# Patient Record
Sex: FEMALE | Employment: UNEMPLOYED | URBAN - METROPOLITAN AREA
[De-identification: names, ages, dates, MRNs, and addresses within clinical notes are randomized per-mention and may not be internally consistent; named-entity substitution may affect disease eponyms.]

---

## 2019-07-20 ENCOUNTER — HOSPITAL ENCOUNTER (EMERGENCY)
Facility: CLINIC | Age: 49
Discharge: HOME | End: 2019-07-20
Attending: EMERGENCY MEDICINE | Admitting: EMERGENCY MEDICINE
Payer: COMMERCIAL

## 2019-07-20 ENCOUNTER — APPOINTMENT (OUTPATIENT)
Dept: CT IMAGING | Facility: CLINIC | Age: 49
End: 2019-07-20
Attending: NURSE PRACTITIONER
Payer: COMMERCIAL

## 2019-07-20 VITALS
HEART RATE: 71 BPM | SYSTOLIC BLOOD PRESSURE: 147 MMHG | TEMPERATURE: 98.7 F | OXYGEN SATURATION: 99 % | RESPIRATION RATE: 18 BRPM | DIASTOLIC BLOOD PRESSURE: 84 MMHG

## 2019-07-20 DIAGNOSIS — S09.90XA MINOR HEAD INJURY, INITIAL ENCOUNTER: ICD-10-CM
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
## 2019-07-20 DIAGNOSIS — S01.81XA FACIAL LACERATION, INITIAL ENCOUNTER: ICD-10-CM

## 2019-07-20 PROCEDURE — 99284 EMERGENCY DEPT VISIT MOD MDM: CPT | Mod: 25

## 2019-07-20 PROCEDURE — 70450 CT HEAD/BRAIN W/O DYE: CPT

## 2019-07-20 PROCEDURE — 12014 RPR F/E/E/N/L/M 5.1-7.5 CM: CPT

## 2019-07-20 RX ORDER — LIDOCAINE HYDROCHLORIDE AND EPINEPHRINE 10; 10 MG/ML; UG/ML
INJECTION, SOLUTION INFILTRATION; PERINEURAL
Status: DISCONTINUED
Start: 2019-07-20 | End: 2019-07-20 | Stop reason: HOSPADM

## 2019-07-20 ASSESSMENT — ENCOUNTER SYMPTOMS
SHORTNESS OF BREATH: 0
WOUND: 1
NAUSEA: 0
VOMITING: 0

## 2019-07-20 NOTE — ED PROVIDER NOTES
History     Chief Complaint:  Fall & Laceration     The history is provided by the patient. The history is limited by a language barrier. A  was used.      Suzette Mckeon is a 49 year old female who presents after fall with concerns for a periorbital laceration in the setting of a possible head injury. Patient states she tripped over something getting out of her car at the gas station, striking her head on an aluminum surface and sustaining a laceration from the corner of her right upper lid radiating superiorly and medially through her eyebrow. She denies any chest pain or dyspnea prior to her fall as well as any syncope following her fall. She also denies any nausea, vomiting, or chance of pregnancy. Per BK, last Td was on 2012.     Allergies:  NKDA     Medications:    Oretic     Past Medical History:    Tendinopathy of left rotator cuff  Chronic right shoulder pain    Past Surgical History:    The patient does not have any pertinent past surgical history.    Family History:    No past pertinent family history.    Social History:  Never smoker.  Positive for alcohol use.   Marital Status: Single    Review of Systems   Eyes: Negative for photophobia and visual disturbance.   Respiratory: Negative for shortness of breath.    Cardiovascular: Negative for chest pain.   Gastrointestinal: Negative for nausea and vomiting.   Skin: Positive for wound.   Neurological: Negative for syncope, weakness and numbness.   All other systems reviewed and are negative.    Physical Exam   Patient Vitals for the past 24 hrs:   BP Temp Temp src Pulse Heart Rate Resp SpO2   07/20/19 1446 161/85 98.7  F (37.1  C) Oral 82 82 18 99 %     Physical Exam  General: Alert, moderate discomfort, well kept  Eyes: PERRL, conjunctivae pink no scleral icterus or conjunctival injection normal extraocular movements  ENT:   Moist mucus membranes, posterior oropharynx clear without erythema or exudates, No lymphadenopathy,  Normal voice, no alarcon signs, no hemotympanum  Resp:  Lungs clear to auscultation bilaterally, no crackles/rubs/wheezes. Good air movement  CV:  Normal rate and rhythm, no murmurs/rubs/gallops  GI:  Abdomen soft and non-distended.  Normoactive BS.  No tenderness, guarding or rebound, No masses  Skin:  Warm, dry.  6 cm diagonal laceration right side periorbital area through eyebrow.   Musculoskeletal: No peripheral edema or calf tenderness, Normal gross ROM, no cervical spine tenderness, normal strength of all extremities  Neuro: Alert and oriented to person/place/time, normal sensation, GCS 15, follows commands  Psychiatric: Normal affect, cooperative, good eye contact    Emergency Department Course     Imaging:  Head CT w/o contrast  Normal CT scan of the head.  Reading per radiology    Procedures:    Narrative: Procedure: Laceration Repair        LACERATION:  A simple clean 6.5 cm laceration.      LOCATION:  Right superior periorbital region      ANESTHESIA:  Local using Lidocaine 1% with Epinephrine total of 4 mLs      PREPARATION:  Irrigation with Normal Saline      DEBRIDEMENT:  no debridement and wound explored, no foreign body found      CLOSURE:  Wound was closed with One Layer.  Skin closed with 15 x 6.0 Ethylon using interrupted sutures.    Emergency Department Course:  1450 Nursing notes and vitals reviewed.  1452 I performed an exam of the patient as documented above.   1501 Sufficient anesthesia obtained as above.   1520 The patient was sent for a CT while in the emergency department, results above.   1546 Laceration repaired as above. I personally reviewed the imaging results with the patient and answered all related questions prior to discharge, anticipatory guidance given.    Impression & Plan      Medical Decision Making:  Suzette Mckeon is a 49 year old female who presents for evaluation following a head injury.  The injury was mechanical in nature.  The patient is not on any blood thinners.   The patient has no neurologic deficit.  The patient had 0 episodes of vomiting.  Imaging was discussed and obtained, based on risk stratification, patient comfort, and symptoms. Given the mechanism of the injury, the lack of focal neurologic deficit, no LOC, no seizure activity, and negative imaging, I believe serious cranial pathology is unlikely.    Patient also had findings and exam consistent with an uncomplicated laceration of superior periorbital region. The wound was carefully evaluated and explored. Was repaired as noted above. There is no evidence at this time of bony injury, foreign body, deep space infection, or neurovascular injury. Follow up in 2-3 days time if concerns over healing. Possible complications (infection, scarring) were reviewed with the patient. The patient is to follow-up for suture removal in 7-10 days. Indications for immediate return to ER/UR were reviewed and included but are not limited to, redness, fevers, drainage, increasing pain, high fevers, or other concerns.     The patient was counseled regarding post-concussive syndrome including cognitive, behavioral and emotional aspects. They were also instructed to refrain from any contact sports until symptom free for at least two weeks or until cleared by a primary care provider. Additionally, the patient is not to participate in strenuous activities for one week or until cleared by a primary care provider. The patient is comfortable with discharge home and out-patient follow up. All questions answered prior to discharge.     Diagnosis:    ICD-10-CM   1. Fall, initial encounter W19.XXXA   2. Minor head injury, initial encounter S09.90XA   3. Facial laceration, initial encounter S01.81XA     Disposition: Home    Scribe Disclosure:  ANAMARIA, Miah Durán, am serving as a scribe at 2:52 PM on 7/20/2019 to document services personally performed by Galileo Villarreal APRN based on my observations and the provider's statements to me.    [facility]  [facility] EMERGENCY DEPARTMENT       Galileo Villarreal, APRN CNP  07/24/19 0202

## 2019-07-20 NOTE — DISCHARGE INSTRUCTIONS
Discharge Instructions  Laceration (Cut)    You were seen today for a laceration (cut).  Your provider examined your laceration for any problems such a buried foreign body (like glass, a splinter, or gravel), or injury to blood vessels, tendons, and nerves.  Your provider may have also rinsed and/or scrubbed your laceration to help prevent an infection. It may not be possible to find all problems with your laceration on the first visit; occasionally foreign bodies or a tendon injury can go undetected.    Your laceration may have been closed in one of several ways:  No closure: many wounds will heal just fine without closure.  Stitches: regular stitches that require removal.  Staples: skin staples are often used in the scalp/head.  Wound adhesive (glue): skin glue can be used for certain lacerations and doesn t require removal.  Wound strips (aka Butterfly bandages or steri-strips): these are bandages that help to close a wound.  Absorbable stitches:  dissolving  stitches that go away on their own and usually don t require removal.    A small percentage of wounds will develop an infection regardless of how well the wound is cared for. Antibiotics are generally not indicated to prevent an infection so are only given for a small number of high-risk wounds. Some lacerations are too high risk to close, and are left open to heal because closure can increase the likelihood that an infection will develop.    Remember that all lacerations, no matter how expertly repaired, will cause scarring. We consider many factors, techniques, and materials, in our efforts to provide the best possible cosmetic outcome.    Generally, every Emergency Department visit should have a follow-up clinic visit with either a primary or a specialty clinic/provider. Please follow-up as instructed by your emergency provider today.     Return to the Emergency Department right away if:  You have more redness, swelling, pain, drainage (pus), a bad smell,  or red streaking from your laceration as these symptoms could indicate an infection.  You have a fever of 100.4 F or more.  You have bleeding that you cannot stop at home. If your cut starts to bleed, hold pressure on the bleeding area with a clean cloth or put pressure over the bandage.  If the bleeding does not stop after using constant pressure for 30 minutes, you should return to the Emergency Department for further treatment.  An area past the laceration is cool, pale, or blue compared with the other side, or has a slower return of color when squeezed.  Your dressing seems too tight or starts to get uncomfortable or painful. For children, signs of a problem might be irritability or restlessness.  You have loss of normal function or use of an area, such as being unable to straighten or bend a finger normally.  You have a numb area past the laceration.    Return to the Emergency Department or see your regular provider if:  The laceration starts to come open.   You have something coming out of the cut or a feeling that there is something in the laceration.  Your wound will not heal, or keeps breaking open. There can always be glass, wood, dirt or other things in any wound.  They will not always show up, even on x-rays.  If a wound does not heal, this may be why, and it is important to follow-up with your regular provider.    Home Care:  Take your dressing off in 12-24 hours, or as instructed by your provider, to check your laceration. Remove the dressing sooner if it seems too tight or painful, or if it is getting numb, tingly, or pale past the dressing.  Gently wash your laceration 1-2 times daily with clean water and mild soap. It is okay to shower or run clean water over the laceration, but do not let the laceration soak in water (no swimming).  If your laceration was closed with wound adhesive or strips: pat it dry and leave it open to the air. For all other repairs: after you wash your laceration, or at least  2 times a day, apply antibiotic ointment (such as Neosporin  or Bacitracin ) to the laceration, then cover it with a Band-Aid  or gauze.  Keep the laceration clean. Wear gloves or other protective clothing if you are around dirt.    Follow-up for removal:  If your wound was closed with staples or regular stitches, they need to be removed according to the instructions and timeline specified by your provider today.  If your wound was closed with absorbable ( dissolving ) sutures, they should fall out, dissolve, or not be visible in about one week. If they are still visible, then they should be removed according to the instructions and timeline specified by your provider today.    Scars:  To help minimize scarring:  Wear sunscreen over the healed laceration when out in the sun.  Massage the area regularly once healed.  You may apply Vitamin E to the healed wound.  Wait. Scars improve in appearance over months and years.    If you were given a prescription for medicine here today, be sure to read all of the information (including the package insert) that comes with your prescription.  This will include important information about the medicine, its side effects, and any warnings that you need to know about.  The pharmacist who fills the prescription can provide more information and answer questions you may have about the medicine.  If you have questions or concerns that the pharmacist cannot address, please call or return to the Emergency Department.       Remember that you can always come back to the Emergency Department if you are not able to see your regular provider in the amount of time listed above, if you get any new symptoms, or if there is anything that worries you.

## 2019-07-20 NOTE — ED AVS SNAPSHOT
[facility] Emergency Department  [address]  [facility] [address]  Phone:  [phone]  Fax:  [phone]                                    Suzette Mckeon   MRN: [id number]    Department:  [facility] Emergency Department   Date of Visit:  7/20/2019           After Visit Summary Signature Page    I have received my discharge instructions, and my questions have been answered. I have discussed any challenges I see with this plan with the nurse or doctor.    ..........................................................................................................................................  Patient/Patient Representative Signature      ..........................................................................................................................................  Patient Representative Print Name and Relationship to Patient    ..................................................               ................................................  Date                                   Time    ..........................................................................................................................................  Reviewed by Signature/Title    ...................................................              ..............................................  Date                                               Time          22EPIC Rev 08/18

## 2019-07-20 NOTE — ED TRIAGE NOTES
Patient fell in the street and hit right eyebrow on something metal. Also complains on left leg pain.

## 2019-07-22 ENCOUNTER — NURSE TRIAGE (OUTPATIENT)
Dept: NURSING | Facility: CLINIC | Age: 49
End: 2019-07-22

## 2019-07-23 NOTE — TELEPHONE ENCOUNTER
Suzette reports bleeding from sutured laceration on her right eyebrow. The bleeding is not excessive but won't stop.    She also reports numbness to her skull above the laceration.    Per ER AVS:  Return to the Emergency Department right away if:    You have more redness, swelling, pain, drainage (pus), a bad smell, or red streaking from your laceration as these  symptoms could indicate an infection.    You have a fever of 100.4 F or more.    You have bleeding that you cannot stop at home. If your cut starts to bleed, hold pressure on the bleeding area with  a clean cloth or put pressure over the bandage. If the bleeding does not stop after using constant pressure for 30  minutes, you should return to the Emergency Department for further treatment.    An area past the laceration is cool, pale, or blue compared with the other side, or has a slower return of color when  squeezed.    Your dressing seems too tight or starts to get uncomfortable or painful. For children, signs of a problem might be  irritability or restlessness.    You have loss of normal function or use of an area, such as being unable to straighten or bend a finger normally.    You have a numb area past the laceration.    Shira Martinez RN  [facility]      Reason for Disposition    [1] Follow-up call to recent contact AND [2] information only call, no triage required    Protocols used: INFORMATION ONLY CALL-A-

## 2019-07-24 ASSESSMENT — ENCOUNTER SYMPTOMS
PHOTOPHOBIA: 0
WEAKNESS: 0
NUMBNESS: 0

## 2019-07-30 ENCOUNTER — HOSPITAL ENCOUNTER (EMERGENCY)
Facility: CLINIC | Age: 49
Discharge: HOME | End: 2019-07-30
Attending: EMERGENCY MEDICINE | Admitting: EMERGENCY MEDICINE
Payer: COMMERCIAL

## 2019-07-30 VITALS
RESPIRATION RATE: 16 BRPM | OXYGEN SATURATION: 99 % | DIASTOLIC BLOOD PRESSURE: 87 MMHG | TEMPERATURE: 98 F | HEART RATE: 89 BPM | SYSTOLIC BLOOD PRESSURE: 128 MMHG

## 2019-07-30 DIAGNOSIS — Z48.02 VISIT FOR SUTURE REMOVAL: ICD-10-CM

## 2019-07-30 DIAGNOSIS — R20.2 PARESTHESIA: ICD-10-CM

## 2019-07-30 PROCEDURE — 99282 EMERGENCY DEPT VISIT SF MDM: CPT

## 2019-07-30 RX ORDER — IBUPROFEN 200 MG
400 TABLET ORAL EVERY 8 HOURS PRN
Qty: 60 TABLET | Refills: 0 | Status: SHIPPED | OUTPATIENT
Start: 2019-07-30

## 2019-07-30 ASSESSMENT — ENCOUNTER SYMPTOMS
ARTHRALGIAS: 1
NUMBNESS: 1

## 2019-07-30 NOTE — ED AVS SNAPSHOT
[facility] Emergency Department  [address]  [facility] [address]  Phone:  [phone]  Fax:  [phone]                                    Suzette Mckeon   MRN: [id number]    Department:  [facility] Emergency Department   Date of Visit:  7/30/2019           After Visit Summary Signature Page    I have received my discharge instructions, and my questions have been answered. I have discussed any challenges I see with this plan with the nurse or doctor.    ..........................................................................................................................................  Patient/Patient Representative Signature      ..........................................................................................................................................  Patient Representative Print Name and Relationship to Patient    ..................................................               ................................................  Date                                   Time    ..........................................................................................................................................  Reviewed by Signature/Title    ...................................................              ..............................................  Date                                               Time          22EPIC Rev 08/18

## 2019-07-30 NOTE — ED TRIAGE NOTES
Patient presents to the ED requesting suture removal.  had sutures placed 10 days ago. No concerns about wound healing, but  has had numbness on the top of the head since the 10th, which she did not have before.

## 2019-07-30 NOTE — ED PROVIDER NOTES
History     Chief Complaint:  Suture Removal and Numbness       HPI   Suzette Mckeon is a 49 year old female who presents to the ED for suture removal and evaluation of scalp numbness.  The patient reports that she had sutures placed 10 days ago following a fall.  She was seen here at that time.  See MDM below for additional details.  She states that her wound has been well healing.  She notes however that she has been experiencing numbness at the apex of her scalp since sustaining the laceration.  This numbness has not improved.  She also notes that she has been experiencing left knee pain since the fall.  She states that she has been trying Advil but this has not improved her symptoms.  She denies any distal numbness or weakness.  She states that she was seen 8 days ago for the left knee pain at an [facility] ED and had an x-ray that revealed a fibular fracture.  She was told that no operation was needed, and was placed in a knee immobilizer and told to be nonweightbearing.  She states that she has not been using the knee immobilizer as she finds it to be uncomfortable.  She was additionally provided crutches. She has not been using these either and has been ambulatory. See MDM and imaging result below.    Medical Decision Making from [facility] ED visit 10 days ago (7/20/19):  Suzette Mckeon is a 49 year old female who presents for evaluation following a head injury.  The injury was mechanical in nature.  The patient is not on any blood thinners.  The patient has no neurologic deficit.  The patient had 0 episodes of vomiting.  Imaging was discussed and obtained, based on risk stratification, patient comfort, and symptoms. Given the mechanism of the injury, the lack of focal neurologic deficit, no LOC, no seizure activity, and negative imaging, I believe serious cranial pathology is unlikely.     Patient also had findings and exam consistent with an uncomplicated laceration of superior periorbital region. The  wound was carefully evaluated and explored. Was repaired as noted above. There is no evidence at this time of bony injury, foreign body, deep space infection, or neurovascular injury. Follow up in 2-3 days time if concerns over healing. Possible complications (infection, scarring) were reviewed with the patient. The patient is to follow-up for suture removal in 7-10 days. Indications for immediate return to ER/UR were reviewed and included but are not limited to, redness, fevers, drainage, increasing pain, high fevers, or other concerns.      The patient was counseled regarding post-concussive syndrome including cognitive, behavioral and emotional aspects. They were also instructed to refrain from any contact sports until symptom free for at least two weeks or until cleared by a primary care provider. Additionally, the patient is not to participate in strenuous activities for one week or until cleared by a primary care provider. The patient is comfortable with discharge home and out-patient follow up. All questions answered prior to discharge.     Impression and Plan from [facility] ED visit 8 days ago (7/22/19)  Suzette Thompson is a 49 y.o. female who presents for evaluation of left knee pain/venous oozing from right eyebrow laceration site that was repaired at another [facility] staffed ED after falling on the street on Saturday. CMS is intact distally in the Left lower extremity. X-rays reveal a nondisplaced fibular fracture that does not need reduction at this time. This is likely a non-operative injury, will place in knee immobilizer, nonweightbearing until orthopedics follow-up, crutch training provided.. Patient educated on natural course of this fracture, provided crutches, knee immobilizer, need for increasing gentle activity, and possible complications of fractures. They need to follow up with primary only and only if further symptoms or progressive symptoms will need to seek care again in UR/ED or  with specialist. Voicemail left for [facility]    In regard to facial laceration, sutures remain intact and wound appears to be healing appropriately. Venous oozing is minimal. Surgifoam placed over the wound and bandaging applied and reassurance provided, wound check recommended by primary physician in the next week.    Imaging from 8 days ago at [facility]:  XR Knee (2 views) Left:  Slight lucency and cortical irregularity seen at the medial aspect of  the proximal left fibular metaphysis. Clinical correlation for a nondisplaced  fibular fracture. Left knee elsewhere is negative. No knee effusion.  Report per radiology.     Allergies:  No Known Allergies     Medications:      ibuprofen (ADVIL/MOTRIN) 200 MG tablet   cyclobenzaprine (FLEXERIL) 10 MG tablet       Past Medical History:    Past Medical History:   Diagnosis Date     Hypertension        There are no active problems to display for this patient.       Past Surgical History:    No past surgical history on file.     Family History:    family history is not on file.    Social History:   reports that she has never smoked. She has never used smokeless tobacco. She reports that she drinks alcohol. She reports that she does not use drugs.    PCP: [facility]     Review of Systems   Musculoskeletal: Positive for arthralgias.   Neurological: Positive for numbness.   All other systems reviewed and are negative.        Physical Exam     Patient Vitals for the past 24 hrs:   BP Temp Pulse Resp SpO2   07/30/19 1024 128/87 98  F (36.7  C) 89 16 99 %        Physical Exam  Constitutional: Pleasant. Cooperative.   Eyes: Pupils equally round and reactive  HENT: Head is normal in appearance. Oropharynx is normal with moist mucus membranes. No bony step offs to apex of R scalp.  Cardiovascular: Regular rate and rhythm and without murmurs.  Respiratory: Normal respiratory effort, lungs are clear bilaterally.  GI: Abdomen is soft, non-tender, non-distended. No  guarding, rebound, or rigidity.  Musculoskeletal: Tenderness to palpation of L proximal fibula. Neurovascularly intact distally.  Skin: Normal, without rash.  Neurologic: Cranial nerves grossly intact, normal cognition, no focal deficits. Alert and oriented x 3.   Psychiatric: Normal affect.  Nursing notes and vital signs reviewed.    Emergency Department Course       Emergency Department Course:  Past medical records, nursing notes, and vitals reviewed.  I performed an exam of the patient and obtained history, as documented above.  Sutures removed by Dr. Rodriguez.  Patient discharged to home.    Impression & Plan      Medical Decision Making:  Suzette Mckeon is a 49 year old female who presents today for evaluation of suture removal and scalp numbness.  Patient had sutures placed 10 days ago after a fall.  She sustained a laceration above her right eyebrow.  Wound has healed well.  She states that since that time she has had increased numbness to the apex of her right scalp.  It was discussed with the patient that this is likely secondary to trauma sustained from the laceration of her right eyebrow to her right supraorbital nerve.  It was discussed that there may likely be some recovery of sensation, however that it may not ultimately fully recover.  With respect to the knee, patient was advised to wear the knee immobilizer and use crutches as directed, as this is a non-operative fracture.  Patient asked for ibuprofen prescription and this was provided.  Sutures were removed without complication.  All questions answered.  Patient was discharged home in stable condition.    Diagnosis:    ICD-10-CM    1. Visit for suture removal Z48.02    2. Paresthesia R20.2         Discharge Medications:     Medication List      Started    ibuprofen 200 MG tablet  Commonly known as:  ADVIL/MOTRIN  400 mg, Oral, EVERY 8 HOURS PRN           Thien Mejia PA-C  [facility] ED  July 30, 2019          Thien Mejia  DAVID  07/30/19 1419

## 2019-07-30 NOTE — ED NOTES
A/O. VSS. Pt verbalized understanding of d/c instructions and ambulated to lobby steady gait.   Script rx ibuprofen given to pt at time of d/c

## 2019-07-31 NOTE — ED PROVIDER NOTES
Emergency Department Attending Supervision Note  7/31/2019  8:24 AM      I evaluated this patient in conjunction with Thien Mejia PA-C      Briefly, the patient presented with  A visit for suture removal from R forehead. Has ongoing R forehead scalp paresthesias.  No fever, redness around wound, or wound drainage.       On my exam,     Gen: Resting comfortably   HEENT: sutures present R forehead diagonally from superior medial brow to inferior lateral brow.  No surrounding erythema, purulent drainage.  Resp: speaking in full sentences with any resp distress  Skin: warm dry well perfused  Neuro: Alert, no gross motor or sensory deficits,  gait stable        My impression is suture removal, no e/o infection.  Paresthesia due to supraorbital nerve injury with initial injury.          Diagnosis    ICD-10-CM    1. Visit for suture removal Z48.02    2. Paresthesia R20.2          Holden Rodriguez MD  [facility]  [facility]       Holden Rodriguez MD  07/31/19 0805

## 2019-11-18 ENCOUNTER — NURSE TRIAGE (OUTPATIENT)
Dept: NURSING | Facility: CLINIC | Age: 49
End: 2019-11-18

## 2019-11-18 NOTE — TELEPHONE ENCOUNTER
FNA triage call :   Presenting problem :  Pt called.  Declines interpretor. Constant , severe Headache  For last month , without fever. Numbness on R side of face .   Guideline used : Headache - A Oh .   Disposition and recommendations : call 911 but Pt hung up before saying if she would call 911 or not .   Caller verbalizes understanding and denies further questions and will call back if further symptoms to triage or questions  . Verónica Holt RN  - [facility] Nurse Advisor     Reason for Disposition    Numbness of the face, arm or leg on one side of the body and new onset    Additional Information    Negative: Difficult to awaken or acting confused (e.g., disoriented, slurred speech)    Negative: Weakness of the face, arm or leg on one side of the body and new onset    Protocols used: HEADACHE-A-OH
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
## 2023-12-18 ENCOUNTER — OFFICE VISIT (OUTPATIENT)
Dept: URGENT CARE | Facility: URGENT CARE | Age: 53
End: 2023-12-18
Payer: COMMERCIAL

## 2023-12-18 VITALS
DIASTOLIC BLOOD PRESSURE: 89 MMHG | RESPIRATION RATE: 16 BRPM | OXYGEN SATURATION: 98 % | HEART RATE: 65 BPM | TEMPERATURE: 97.4 F | WEIGHT: 179 LBS | SYSTOLIC BLOOD PRESSURE: 147 MMHG

## 2023-12-18 DIAGNOSIS — H10.9 BACTERIAL CONJUNCTIVITIS OF BOTH EYES: Primary | ICD-10-CM

## 2023-12-18 DIAGNOSIS — B96.89 BACTERIAL CONJUNCTIVITIS OF BOTH EYES: Primary | ICD-10-CM

## 2023-12-18 PROCEDURE — 99203 OFFICE O/P NEW LOW 30 MIN: CPT

## 2023-12-18 RX ORDER — POLYMYXIN B SULFATE AND TRIMETHOPRIM 1; 10000 MG/ML; [USP'U]/ML
2 SOLUTION OPHTHALMIC EVERY 6 HOURS
Qty: 10 ML | Refills: 0 | Status: SHIPPED | OUTPATIENT
Start: 2023-12-18 | End: 2023-12-25

## 2023-12-18 NOTE — LETTER
December 18, 2023      Suzette Mckeon  [address] [address] [place] [id number]        To Whom It May Concern:    Suzette Mckeon  was seen on December 18, 2023.  Please excuse her from work until December 20th due to illness.        Sincerely,        ROSALEE Cai CNP

## 2023-12-18 NOTE — PROGRESS NOTES
ASSESSMENT:  (H10.9,  B96.89) Bacterial conjunctivitis of both eyes  (primary encounter diagnosis)  Plan: polymixin b-trimethoprim (POLYTRIM) 21575-6.1         UNIT/ML-% ophthalmic solution    PLAN:  Conjunctivitis patient instructions in [place] discussed and provided.  Informed the patient to use eyedrops as prescribed and finish the full course even if symptoms improve.  We discussed using warm compresses to the eyes for the symptoms.  Work note provided.  Discussed the need to return to clinic with any new or worsening symptoms.  Patient acknowledged her understanding of the above plan.    The use of Dragon[employer] services may have been used to construct the content in this note; any grammatical or spelling errors are non-intentional. Please contact the author of this note directly if you are in need of any clarification.      ROSALEE Cai CNP    SUBJECTIVE:  Suzette Mckeon is a 53 year old female who presents complaining of moderate bilateral eye discomfort, itching, drainage, redness and watering for 2 days.   Details:  Associated Signs and Syptoms: none  Treatment measures tried include: warm washcloth and clear eyes eye drops  Contact wearer : no    ROS: negative except noted above      OBJECTIVE:  BP (!) 147/89 (BP Location: Left arm, Patient Position: Sitting, Cuff Size: Adult Regular)   Pulse 65   Temp 97.4  F (36.3  C) (Tympanic)   Resp 16   Wt 81.2 kg (179 lb)   SpO2 98%   General: no acute distress  Eye exam: bilateral eyes: lids normal; PERRL, EOM's intact; mild conjunctival injection; yellow drainage in the right inner canthus; increased watering

## 2023-12-18 NOTE — PATIENT INSTRUCTIONS
Use the eyedrops as prescribed and finish the full course even if symptoms improve.  You can also use warm compresses to the eyes for the symptoms.

## 2024-09-05 ENCOUNTER — OFFICE VISIT (OUTPATIENT)
Dept: OPTOMETRY | Facility: CLINIC | Age: 54
End: 2024-09-05
Payer: COMMERCIAL

## 2024-09-05 DIAGNOSIS — H02.051 TRICHIASIS OF RIGHT UPPER EYELID: Primary | ICD-10-CM

## 2024-09-05 PROCEDURE — 99203 OFFICE O/P NEW LOW 30 MIN: CPT | Mod: 25 | Performed by: OPTOMETRIST

## 2024-09-05 PROCEDURE — 67820 REVISE EYELASHES: CPT | Mod: E3 | Performed by: OPTOMETRIST

## 2024-09-05 ASSESSMENT — VISUAL ACUITY
CORRECTION_TYPE: GLASSES
OD_CC: 20/25
METHOD: SNELLEN - LINEAR
OS_CC: 20/40

## 2024-09-05 ASSESSMENT — CONF VISUAL FIELD
OD_INFERIOR_TEMPORAL_RESTRICTION: 0
OS_INFERIOR_NASAL_RESTRICTION: 0
OS_SUPERIOR_TEMPORAL_RESTRICTION: 0
OD_NORMAL: 1
OS_INFERIOR_TEMPORAL_RESTRICTION: 0
OS_SUPERIOR_NASAL_RESTRICTION: 0
OD_SUPERIOR_TEMPORAL_RESTRICTION: 0
OD_SUPERIOR_NASAL_RESTRICTION: 0
OD_INFERIOR_NASAL_RESTRICTION: 0
OS_NORMAL: 1

## 2024-09-05 ASSESSMENT — EXTERNAL EXAM - LEFT EYE: OS_EXAM: NORMAL

## 2024-09-05 ASSESSMENT — TONOMETRY
OS_IOP_MMHG: 17
IOP_METHOD: APPLANATION
OD_IOP_MMHG: 17

## 2024-09-05 ASSESSMENT — EXTERNAL EXAM - RIGHT EYE: OD_EXAM: NORMAL

## 2024-09-05 ASSESSMENT — SLIT LAMP EXAM - LIDS
COMMENTS: TRICHIASIS - UPPER LID
COMMENTS: NORMAL

## 2024-09-05 NOTE — PROGRESS NOTES
Chief Complaint   Patient presents with    Eye Problem Right Eye     Right eye feels sharp pain and darkness that has started this morning. No report of flashes or blurred vision.              Lamberto Mcdaniel - Optometric Assistant     See Review Of Systems       Medical, surgical and family histories reviewed and updated 9/5/2024.         OBJECTIVE: See Ophthalmology exam    ASSESSMENT:    ICD-10-CM    1. 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    Epilation (plucking) of the upper eyelid will help with improving comfort.    The cornea replaces itself weekly.    The eye redness will subside as the conjunctiva heals.    RTC 2-4 week for CEE or sooner if symptoms worsens      Elvia Yan O.D.  [address] [address]    [phone]

## 2024-09-05 NOTE — LETTER
9/5/2024      Suzette Mckeon  [address] [address] [place] [id number]      Dear Colleague,    Thank you for referring your patient, Suzette Mckeon, to the [facility]. Please see a copy of my visit note below.    Chief Complaint   Patient presents with     Eye Problem Right Eye     Right eye feels sharp pain and darkness that has started this morning. No report of flashes or blurred vision.              Lamberto Mcdaniel - Optometric Assistant     See Review Of Systems       Medical, surgical and family histories reviewed and updated 9/5/2024.         OBJECTIVE: See Ophthalmology exam    ASSESSMENT:    ICD-10-CM    1. 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    Epilation (plucking) of the upper eyelid will help with improving comfort.    The cornea replaces itself weekly.    The eye redness will subside as the conjunctiva heals.    RTC 2-4 week for CEE or sooner if symptoms worsens      Elvia Yan O.D.  [facility]   [address] [place] [address]    [phone]         Again, thank you for allowing me to participate in the care of your patient.        Sincerely,        Elvia Yan OD

## 2024-09-05 NOTE — PATIENT INSTRUCTIONS
Trichiasis is today's diagnosis.    Epilation (plucking) of the upper eyelid will help with improving comfort.    The cornea replaces itself weekly.    The eye redness will subside as the conjunctiva heals.    RTC 2-4 week for CEE or sooner if symptoms worsens      Elvia Yan O.D.  [address] [address]    [phone]

## 2024-09-27 ENCOUNTER — ANCILLARY PROCEDURE (OUTPATIENT)
Dept: GENERAL RADIOLOGY | Facility: CLINIC | Age: 54
End: 2024-09-27
Attending: PHYSICIAN ASSISTANT
Payer: COMMERCIAL

## 2024-09-27 ENCOUNTER — ANCILLARY PROCEDURE (OUTPATIENT)
Dept: GENERAL RADIOLOGY | Facility: CLINIC | Age: 54
End: 2024-09-27
Attending: PHYSICIAN ASSISTANT

## 2024-09-27 ENCOUNTER — OFFICE VISIT (OUTPATIENT)
Dept: URGENT CARE | Facility: URGENT CARE | Age: 54
End: 2024-09-27

## 2024-09-27 VITALS
HEART RATE: 88 BPM | WEIGHT: 188.6 LBS | TEMPERATURE: 98.6 F | OXYGEN SATURATION: 97 % | RESPIRATION RATE: 18 BRPM | DIASTOLIC BLOOD PRESSURE: 84 MMHG | SYSTOLIC BLOOD PRESSURE: 129 MMHG

## 2024-09-27 DIAGNOSIS — M54.50 ACUTE LEFT-SIDED LOW BACK PAIN WITHOUT SCIATICA: ICD-10-CM

## 2024-09-27 DIAGNOSIS — M54.2 NECK PAIN ON LEFT SIDE: ICD-10-CM
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
## 2024-09-27 DIAGNOSIS — M25.512 ACUTE PAIN OF LEFT SHOULDER: ICD-10-CM

## 2024-09-27 DIAGNOSIS — Z98.1 S/P CERVICAL SPINAL FUSION: ICD-10-CM
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
## 2024-09-27 PROCEDURE — 72100 X-RAY EXAM L-S SPINE 2/3 VWS: CPT | Mod: TC | Performed by: RADIOLOGY

## 2024-09-27 PROCEDURE — 99214 OFFICE O/P EST MOD 30 MIN: CPT | Performed by: PHYSICIAN ASSISTANT

## 2024-09-27 PROCEDURE — 73030 X-RAY EXAM OF SHOULDER: CPT | Mod: TC | Performed by: RADIOLOGY

## 2024-09-27 RX ORDER — NAPROXEN 500 MG/1
500 TABLET ORAL 2 TIMES DAILY WITH MEALS
Qty: 20 TABLET | Refills: 0 | Status: SHIPPED | OUTPATIENT
Start: 2024-09-27

## 2024-09-27 RX ORDER — METHOCARBAMOL 500 MG/1
500 TABLET, FILM COATED ORAL 3 TIMES DAILY PRN
Qty: 30 TABLET | Refills: 0 | Status: SHIPPED | OUTPATIENT
Start: 2024-09-27 | End: 2024-10-07

## 2024-09-27 ASSESSMENT — PAIN SCALES - GENERAL: PAINLEVEL: EXTREME PAIN (8)
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
## 2024-10-04 DIAGNOSIS — M25.512 ACUTE PAIN OF LEFT SHOULDER: ICD-10-CM
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
## 2024-10-04 DIAGNOSIS — Z98.1 S/P CERVICAL SPINAL FUSION: ICD-10-CM

## 2024-10-04 DIAGNOSIS — M54.50 ACUTE LEFT-SIDED LOW BACK PAIN WITHOUT SCIATICA: ICD-10-CM

## 2024-10-04 DIAGNOSIS — M54.2 NECK PAIN ON LEFT SIDE: ICD-10-CM

## 2024-10-04 RX ORDER — NAPROXEN 500 MG/1
500 TABLET ORAL 2 TIMES DAILY WITH MEALS
Qty: 20 TABLET | Refills: 0 | OUTPATIENT
Start: 2024-10-04

## 2024-12-05 ENCOUNTER — OFFICE VISIT (OUTPATIENT)
Dept: OPTOMETRY | Facility: CLINIC | Age: 54
End: 2024-12-05
Payer: COMMERCIAL

## 2024-12-05 DIAGNOSIS — H25.13 AGE-RELATED NUCLEAR CATARACT OF BOTH EYES: ICD-10-CM

## 2024-12-05 DIAGNOSIS — H52.03 HYPEROPIA, BILATERAL: ICD-10-CM

## 2024-12-05 DIAGNOSIS — H52.4 PRESBYOPIA: ICD-10-CM

## 2024-12-05 DIAGNOSIS — H10.13 ALLERGIC CONJUNCTIVITIS, BILATERAL: Primary | ICD-10-CM

## 2024-12-05 RX ORDER — OLOPATADINE HYDROCHLORIDE 1 MG/ML
1 SOLUTION/ DROPS OPHTHALMIC 2 TIMES DAILY
Qty: 5 ML | Refills: 11 | Status: SHIPPED | OUTPATIENT
Start: 2024-12-05

## 2024-12-05 ASSESSMENT — EXTERNAL EXAM - LEFT EYE: OS_EXAM: NORMAL

## 2024-12-05 ASSESSMENT — REFRACTION_MANIFEST
OD_AXIS: 165
OD_SPHERE: +1.50
OD_ADD: +2.50
OD_CYLINDER: +0.50
OD_SPHERE: +1.50
OS_CYLINDER: SPHERE
OS_SPHERE: +2.00
OS_SPHERE: +2.25
OS_ADD: +2.50
OD_CYLINDER: SPHERE

## 2024-12-05 ASSESSMENT — CONF VISUAL FIELD
OS_INFERIOR_TEMPORAL_RESTRICTION: 0
OS_SUPERIOR_NASAL_RESTRICTION: 0
OD_INFERIOR_TEMPORAL_RESTRICTION: 0
OS_SUPERIOR_TEMPORAL_RESTRICTION: 0
OS_NORMAL: 1
OD_SUPERIOR_TEMPORAL_RESTRICTION: 0
OD_SUPERIOR_NASAL_RESTRICTION: 0
OD_INFERIOR_NASAL_RESTRICTION: 0
OD_NORMAL: 1
OS_INFERIOR_NASAL_RESTRICTION: 0

## 2024-12-05 ASSESSMENT — EXTERNAL EXAM - RIGHT EYE: OD_EXAM: NORMAL

## 2024-12-05 ASSESSMENT — VISUAL ACUITY
OS_CC: 20/25
OS_CC: 20/30
OD_PH_CC: 20/40
OD_CC: 20/25
OD_CC: 20/25
CORRECTION_TYPE: GLASSES
METHOD: SNELLEN - LINEAR

## 2024-12-05 ASSESSMENT — SLIT LAMP EXAM - LIDS
COMMENTS: TRICHIASIS - UPPER LID
COMMENTS: NORMAL

## 2024-12-05 ASSESSMENT — KERATOMETRY
OD_K1POWER_DIOPTERS: 42.75
OD_AXISANGLE_DEGREES: 80
OS_AXISANGLE_DEGREES: 93
OD_AXISANGLE2_DEGREES: 170
OD_K2POWER_DIOPTERS: 43.50
OS_K2POWER_DIOPTERS: 43.50
OS_AXISANGLE2_DEGREES: 3
OS_K1POWER_DIOPTERS: 42.50

## 2024-12-05 ASSESSMENT — REFRACTION_WEARINGRX
SPECS_TYPE: BIFOCAL
OS_SPHERE: +1.75
OS_CYLINDER: +0.25
OD_CYLINDER: SPHERE
OD_ADD: +2.50
OS_AXIS: 010
OS_ADD: +2.50
OD_SPHERE: +1.50

## 2024-12-05 ASSESSMENT — TONOMETRY
OS_IOP_MMHG: 18
IOP_METHOD: APPLANATION
OD_IOP_MMHG: 18

## 2024-12-05 ASSESSMENT — CUP TO DISC RATIO
OS_RATIO: 0.3
OD_RATIO: 0.3

## 2024-12-05 NOTE — LETTER
12/5/2024      Suzette Mckeon  [address] [place] [address]      Dear Colleague,    Thank you for referring your patient, Suzette Mckeon, to the [facility]. Please see a copy of my visit note below.    Chief Complaint   Patient presents with     Annual Eye Exam         Last Eye Exam: 2023  Dilated Previously: Yes    What are you currently using to see?  glasses       Distance Vision Acuity: Noticed gradual change in both eyes    Near Vision Acuity: Not satisfied     Eye Comfort: watery and sore on the right side mostly, but a little on the left side.   Do you use eye drops? : No      Denelle Violeta - Optometric Assistant          Medical, surgical and family histories reviewed and updated 12/5/2024.       OBJECTIVE: See Ophthalmology exam    ASSESSMENT:  No diagnosis found.    PLAN:     There are no Patient Instructions on file for this visit.       Again, thank you for allowing me to participate in the care of your patient.        Sincerely,        Elvia Yan OD

## 2024-12-05 NOTE — PROGRESS NOTES
Chief Complaint   Patient presents with    Annual Eye Exam         Last Eye Exam: 2023  Dilated Previously: Yes    What are you currently using to see?  glasses       Distance Vision Acuity: Noticed gradual change in both eyes    Near Vision Acuity: Not satisfied     Eye Comfort: watery and sore on the right side mostly, but a little on the left side.   Do you use eye drops? : No      Denelle Violeta - Optometric Assistant          Medical, surgical and family histories reviewed and updated 12/5/2024.       OBJECTIVE: See Ophthalmology exam    ASSESSMENT:  No diagnosis found.    PLAN:     There are no Patient Instructions on file for this visit.

## 2025-07-21 ENCOUNTER — APPOINTMENT (OUTPATIENT)
Dept: OPTOMETRY | Facility: CLINIC | Age: 55
End: 2025-07-21
Payer: COMMERCIAL

## 2025-07-21 PROCEDURE — 92341 FIT SPECTACLES BIFOCAL: CPT | Performed by: OPTOMETRIST
